# Patient Record
Sex: FEMALE | Race: WHITE
[De-identification: names, ages, dates, MRNs, and addresses within clinical notes are randomized per-mention and may not be internally consistent; named-entity substitution may affect disease eponyms.]

---

## 2017-02-12 ENCOUNTER — HOSPITAL ENCOUNTER (EMERGENCY)
Dept: HOSPITAL 25 - UCCORT | Age: 47
Discharge: HOME | End: 2017-02-12
Payer: COMMERCIAL

## 2017-02-12 VITALS — SYSTOLIC BLOOD PRESSURE: 115 MMHG | DIASTOLIC BLOOD PRESSURE: 80 MMHG

## 2017-02-12 DIAGNOSIS — Z88.1: ICD-10-CM

## 2017-02-12 DIAGNOSIS — J01.90: Primary | ICD-10-CM

## 2017-02-12 DIAGNOSIS — Z88.0: ICD-10-CM

## 2017-02-12 PROCEDURE — G0463 HOSPITAL OUTPT CLINIC VISIT: HCPCS

## 2017-02-12 PROCEDURE — 99212 OFFICE O/P EST SF 10 MIN: CPT

## 2017-02-12 NOTE — UC
Respiratory Complaint HPI





- HPI Summary


HPI Summary: 





47 yo female with severe sinus pressure and pain x 4 days


upper teeth and gums tender





occipital HA





feverish











- History of Current Complaint


Chief Complaint: UCRespiratory


Stated Complaint: CONGESTION


Time Seen by Provider: 02/12/17 07:59


Hx Obtained From: Patient


Hx Last Menstrual Period: 2 weeks


Onset/Duration: Gradual Onset, Lasting Days


Timing: Constant


Severity Initially: Moderate


Severity Currently: Moderate


Pain Intensity: 4


Pain Scale Used: 0-10 Numeric


Character: Cough: Nonproductive


Aggravating Factors: Nothing


Alleviating Factors: Nothing


Associated Signs And Symptoms: Positive: Nasal Congestion, Hoarseness, Sinus 

Discomfort





- Allergies/Home Medications


Allergies/Adverse Reactions: 


 Allergies











Allergy/AdvReac Type Severity Reaction Status Date / Time


 


Doxycycline Allergy Severe Hives Verified 02/12/17 07:57


 


Penicillins Allergy Unknown Unknown Verified 02/12/17 07:57





   Reaction  





   Details  











Home Medications: 


 Home Medications





guaiFENesin ER TAB [Mucinex*] 600 mg PO Q4H PRN 02/12/17 [History Confirmed 02/ 12/17]











PMH/Surg Hx/FS Hx/Imm Hx


Previously Healthy: Yes


Cardiovascular History Of: Reports: Hypertension





- Surgical History


Surgical History: Yes


Surgery Procedure, Year, and Place: lumpectomy- 2004.  uterus mass removed- 

benign





- Family History


Known Family History: Positive: Hypertension





- Social History


Alcohol Use: Rare


Substance Use Type: None


Smoking Status (MU): Never Smoked Tobacco





- Immunization History


Most Recent Influenza Vaccination: none





Review of Systems


Constitutional: Negative


Skin: Negative


Eyes: Negative


ENT: Sore Throat, Nasal Discharge


Respiratory: Cough


Cardiovascular: Negative


Gastrointestinal: Negative


Genitourinary: Negative


Motor: Negative


Neurovascular: Negative


Musculoskeletal: Negative


Neurological: Negative


Psychological: Negative


All Other Systems Reviewed And Are Negative: Yes





Physical Exam


Triage Information Reviewed: Yes


Appearance: Well-Appearing, No Pain Distress, Well-Nourished


Vital Signs: 


 Initial Vital Signs











Temp  100.5 F   02/12/17 07:50


 


Pulse  97   02/12/17 07:50


 


Resp  20   02/12/17 07:50


 


BP  115/80   02/12/17 07:50


 


Pulse Ox  99   02/12/17 07:50











Vital Signs Reviewed: Yes


Eyes: Positive: Conjunctiva Clear


ENT: Positive: Hearing grossly normal, Nasal congestion, Nasal drainage, Other: 

- bilateral max sinus tenderness.  Negative: TMs normal, TM bulging, Trismus, 

Muffled/hoarse voice


Neck: Positive: Supple, Nontender


Respiratory: Positive: Lungs clear, Normal breath sounds, No respiratory 

distress, No accessory muscle use


Cardiovascular: Positive: RRR, No Murmur


Musculoskeletal: Positive: ROM Intact, No Edema


Neurological Exam: Normal


Neurological: Positive: Alert


Psychological Exam: Normal


Skin Exam: Normal





UC Diagnostic Evaluation





- Laboratory


O2 Sat by Pulse Oximetry: 99





Respiratory Course/Dx





- Differential Dx/Diagnosis


Provider Diagnoses: acute sinusitis





Discharge





- Discharge Plan


Condition: Stable


Disposition: HOME


Prescriptions: 


Cefuroxime Axetil [Ceftin 250 MG] 250 mg PO BID #20 tab


Patient Education Materials:  Sinusitis (ED)


Referrals: 


Shasta Esquivel MD [Primary Care Provider] - 5 Days (if not better)


Additional Instructions: 


afrin nasal spray 2 sprays each nostril three times daily for 3 days

## 2018-09-23 ENCOUNTER — HOSPITAL ENCOUNTER (EMERGENCY)
Dept: HOSPITAL 25 - UCCORT | Age: 48
Discharge: HOME | End: 2018-09-23
Payer: COMMERCIAL

## 2018-09-23 VITALS — SYSTOLIC BLOOD PRESSURE: 134 MMHG | DIASTOLIC BLOOD PRESSURE: 93 MMHG

## 2018-09-23 DIAGNOSIS — Z88.0: ICD-10-CM

## 2018-09-23 DIAGNOSIS — J32.9: Primary | ICD-10-CM

## 2018-09-23 DIAGNOSIS — Z88.1: ICD-10-CM

## 2018-09-23 PROCEDURE — 99212 OFFICE O/P EST SF 10 MIN: CPT

## 2018-09-23 PROCEDURE — G0463 HOSPITAL OUTPT CLINIC VISIT: HCPCS

## 2018-09-23 NOTE — UC
Headache HPI





- HPI Summary


HPI Summary: 





48-year-old female history of sinus infections in the past, presents with 3 

days of right-sided headache, and right-sided sinus pressure associated with 

nasal drainage.  Patient took DayQuil  for the past 2 days without relief.  

Similar prior episodes.  Denies any associated fever.  No remitting factors.  

Denies any neck pain, neck stiffness, photophobia, or phonophobia.  No pain 

with extraocular movements.  No visual changes. 





- History Of Current Complaint


Chief Complaint: UCGeneralIllness


Stated Complaint: SINUS PRESSURE


Hx Last Menstrual Period: 9/16/18


Pain Intensity: 7





- Allergies/Home Medications


Allergies/Adverse Reactions: 


 Allergies











Allergy/AdvReac Type Severity Reaction Status Date / Time


 


MS Doxycycline [Doxycycline] Allergy Severe Hives Verified 09/23/18 12:16


 


MS Penicillins [Penicillins] Allergy Unknown Unknown Verified 09/23/18 12:16





   Reaction  





   Details  














PMH/Surg Hx/FS Hx/Imm Hx





- Additional Past Medical History


Additional PMH: 





History of sinusitis in the past


Previously Healthy: Yes





- Surgical History


Surgical History: Yes


Surgery Procedure, Year, and Place: lumpectomy- 2004 Benign.  uterus mass 

removed- benign





- Family History


Known Family History: Positive: None, Hypertension





- Social History


Alcohol Use: Rare


Substance Use Type: None


Smoking Status (MU): Never Smoked Tobacco





- Immunization History


Most Recent Influenza Vaccination: none





Review of Systems


Constitutional: Negative


Skin: Negative


Eyes: Negative


ENT: Sinus Congestion, Sinus Pain/Tenderness


Respiratory: Negative


Cardiovascular: Negative


Gastrointestinal: Negative


Motor: Negative


Neurovascular: Negative


Musculoskeletal: Negative


All Other Systems Reviewed And Are Negative: Yes





Physical Exam





- Summary


Physical Exam Summary: 





Gen: alert, in no acute distress


HEENT: EOMI, normocephalic, atruamatic. R sided maxillary sinus tenderness to 

percussion.  Normal TMs bilaterally


Neck: supple, no masses


CV: Normal s1 s2, no murmurs


Resp: normal breath sounds b/l


GI: no tenderness, no masses


Musculoskeletal: normal ROM all 4 extremities


Neuro: no obvious focal neurological deficits


Skin: no rash


Lymph: no lymphadenopathy


Psych: appropriate affect, oriented


Triage Information Reviewed: Yes


Vital Signs: 


 Initial Vital Signs











Temp  36.8 C   09/23/18 12:11


 


Pulse  81   09/23/18 12:11


 


Resp  18   09/23/18 12:11


 


BP  134/93   09/23/18 12:11


 


Pulse Ox  99   09/23/18 12:11














Headache Course/Dx





- Course


Course Of Treatment: Patient started on azithromycin for empiric treatment of 

sinusitis, instructed to follow up with her primary care doctor within one week 

and to report to the ED for any worsening or concerning symptoms.  Patient 

agrees to and understands discharge instructions.





- Differential Dx/Diagnosis


Provider Diagnoses: sinusitis





Discharge





- Sign-Out/Discharge


Documenting (check all that apply): Patient Departure


All imaging exams completed and their final reports reviewed: No Studies





- Discharge Plan


Condition: Stable


Disposition: HOME


Prescriptions: 


Azithromyxin KHUSHBU (NF) [Z-Khushbu (Zithromax) 250 mg tabs #6] 2 tab PO .TODAY, THEN 

1 DAILY #6 tab


Patient Education Materials:  Rhinosinusitis (DC)


Referrals: 


Shasta Esquivel MD [Primary Care Provider] - 


Additional Instructions: 


PLEASE FINISH FULL COURSE OF ANTIBIOTIC





PLEASE MAKE AN APPOINTMENT TO BE SEEN BY A PRIMARY CARE DOCTOR WITHIN 1 WEEK





PLEASE REPORT TO THE ER FOR ANY WORSENING OR CONCERNING SYMPTOMS





- Billing Disposition and Condition


Condition: STABLE


Disposition: Home

## 2018-12-17 ENCOUNTER — HOSPITAL ENCOUNTER (EMERGENCY)
Dept: HOSPITAL 25 - UCCORT | Age: 48
Discharge: HOME | End: 2018-12-17
Payer: COMMERCIAL

## 2018-12-17 VITALS — SYSTOLIC BLOOD PRESSURE: 122 MMHG | DIASTOLIC BLOOD PRESSURE: 80 MMHG

## 2018-12-17 DIAGNOSIS — J32.9: ICD-10-CM

## 2018-12-17 DIAGNOSIS — Z88.1: ICD-10-CM

## 2018-12-17 DIAGNOSIS — I10: ICD-10-CM

## 2018-12-17 DIAGNOSIS — H10.9: Primary | ICD-10-CM

## 2018-12-17 PROCEDURE — G0463 HOSPITAL OUTPT CLINIC VISIT: HCPCS

## 2018-12-17 PROCEDURE — 99212 OFFICE O/P EST SF 10 MIN: CPT

## 2018-12-17 NOTE — ED
Throat Pain/Nasal Congestion





- HPI Summary


HPI Summary: 





48 yr old female with the complaint of sinus congestion, post nasal drip, and 

now some redness and irritation to the eyes with some discharge mostly left 

eye. Onset of symptoms couple of weeks.  She denies contact lens use.  She has 

no other complaints.  





- History of Current Complaint


Chief Complaint: UCGeneralIllness


Time Seen by Provider: 12/17/18 17:18





- Allergies/Home Medications


Allergies/Adverse Reactions: 


 Allergies











Allergy/AdvReac Type Severity Reaction Status Date / Time


 


doxycycline Allergy  Hives Verified 12/17/18 17:09


 


Penicillins Allergy  Unknown Verified 12/17/18 17:09





   Reaction  





   Details  











Home Medications: 


 Home Medications





Loratadine/Pseudoephedrine [Claritin-D 12 Hour Tablet] 1 each PO DAILY 12/17/18 

[History Confirmed 12/17/18]


Propranolol HCl [Inderal LA] 1 each PO DAILY 12/17/18 [History Confirmed 12/17/ 18]











PMH/Surg Hx/FS Hx/Imm Hx


Cardiovascular History: Reports: Hx Hypertension





- Surgical History


Surgery Procedure, Year, and Place: lumpectomy- 2004 Benign.  uterus mass 

removed- benign


Infectious Disease History: No


Infectious Disease History: 


   Denies: Traveled Outside the [place] in Last 30 Days





- Family History


Known Family History: Positive: None, Hypertension





- Social History


Occupation: Employed Full-time


Alcohol Use: Rare


Substance Use Type: Reports: None


Smoking Status (MU): Never Smoked Tobacco





Review of Systems


Constitutional: Negative


Positive: Drainage, Erythema


Positive: Nasal Discharge


All Other Systems Reviewed And Are Negative: Yes





Physical Exam


Triage Information Reviewed: Yes


Vital Signs On Initial Exam: 


 Initial Vitals











Temp Pulse Resp BP Pulse Ox


 


 98.1 F   81   16   122/80   100 


 


 12/17/18 17:06  12/17/18 17:06  12/17/18 17:06  12/17/18 17:06  12/17/18 17:06











Vital Signs Reviewed: Yes


Appearance: Positive: Well-Appearing, No Pain Distress


Skin: Positive: Warm, Skin Color Reflects Adequate Perfusion


Head/Face: Positive: Normal Head/Face Inspection


Eyes: Positive: EOMI, Conjunctiva Inflammed.  Negative: Discharge


ENT: Positive: Nasal congestion, TMs normal, Sinus tenderness


Neck: Positive: Nontender


Respiratory/Lung Sounds: Positive: Clear to Auscultation, Breath Sounds Present


Cardiovascular: Positive: RRR.  Negative: Murmur


Abdomen Description: Positive: Nontender


Musculoskeletal: Positive: Strength/ROM Intact


Neurological: Positive: Sensory/Motor Intact, Alert, Oriented to Person Place, 

Time, CN Intact II-III, Normal Gait, Speech Normal


Psychiatric: Positive: Normal





- Will Coma Scale


Best Eye Response: 4 - Spontaneous


Best Motor Response: 6 - Obeys Commands


Best Verbal Response: 5 - Oriented


Coma Scale Total: 15





Diagnostics





- Vital Signs


 Vital Signs











  Temp Pulse Resp BP Pulse Ox


 


 12/17/18 17:06  98.1 F  81  16  122/80  100














- Laboratory


Lab Statement: Any lab studies that have been ordered have been reviewed, and 

results considered in the medical decision making process.





EENT Course/Dx





- Course


Course Of Treatment: 48 yr old with sinusitis and conjunctivitis.  Rx Biaxin 

and sulfacetamide drops.





- Diagnoses


Provider Diagnoses: 


 Sinusitis, Conjunctivitis








Discharge





- Sign-Out/Discharge


Documenting (check all that apply): Patient Departure


All imaging exams completed and their final reports reviewed: No Studies





- Discharge Plan


Condition: Good


Disposition: HOME


Prescriptions: 


Clarithromycin TAB* [Biaxin 500 MG TAB*] 500 mg PO BID #20 tab


Sulfacetamide 10 % OPTH.SOL* [Sulamyd 10% Opth*] 1 drop BOTH EYES Q4H #1 btl


Patient Education Materials:  Sinusitis (ED), Conjunctivitis (ED)


Referrals: 


Shasta Esquivel MD [Primary Care Provider] - 





- Billing Disposition and Condition


Condition: GOOD


Disposition: Home